# Patient Record
Sex: FEMALE | Race: WHITE | Employment: FULL TIME | URBAN - METROPOLITAN AREA
[De-identification: names, ages, dates, MRNs, and addresses within clinical notes are randomized per-mention and may not be internally consistent; named-entity substitution may affect disease eponyms.]

---

## 2017-11-13 PROBLEM — Z98.890 S/P LEEP OF CERVIX: Status: ACTIVE | Noted: 2017-11-13

## 2017-11-13 PROCEDURE — 88175 CYTOPATH C/V AUTO FLUID REDO: CPT | Performed by: OBSTETRICS & GYNECOLOGY

## 2017-11-13 PROCEDURE — 87624 HPV HI-RISK TYP POOLED RSLT: CPT | Performed by: OBSTETRICS & GYNECOLOGY

## 2021-09-07 PROBLEM — Z91.89 AT HIGH RISK FOR BREAST CANCER: Status: ACTIVE | Noted: 2021-09-07

## 2023-03-10 ENCOUNTER — APPOINTMENT (OUTPATIENT)
Dept: GENERAL RADIOLOGY | Age: 48
End: 2023-03-10
Attending: NURSE PRACTITIONER
Payer: COMMERCIAL

## 2023-03-10 ENCOUNTER — HOSPITAL ENCOUNTER (OUTPATIENT)
Age: 48
Discharge: HOME | End: 2023-03-10
Payer: COMMERCIAL

## 2023-03-10 VITALS
DIASTOLIC BLOOD PRESSURE: 70 MMHG | HEART RATE: 87 BPM | SYSTOLIC BLOOD PRESSURE: 133 MMHG | OXYGEN SATURATION: 100 % | TEMPERATURE: 98 F | RESPIRATION RATE: 16 BRPM

## 2023-03-10 DIAGNOSIS — Z20.822 ENCOUNTER FOR LABORATORY TESTING FOR COVID-19 VIRUS: ICD-10-CM

## 2023-03-10 DIAGNOSIS — J01.01 ACUTE RECURRENT MAXILLARY SINUSITIS: Primary | ICD-10-CM

## 2023-03-10 DIAGNOSIS — R05.1 ACUTE COUGH: ICD-10-CM

## 2023-03-10 DIAGNOSIS — H10.022 OTHER MUCOPURULENT CONJUNCTIVITIS OF LEFT EYE: ICD-10-CM

## 2023-03-10 DIAGNOSIS — M94.0 COSTOCHONDRITIS: ICD-10-CM

## 2023-03-10 LAB
POCT INFLUENZA A: NEGATIVE
POCT INFLUENZA B: NEGATIVE
S PYO AG THROAT QL: NEGATIVE
SARS-COV-2 RNA RESP QL NAA+PROBE: NOT DETECTED

## 2023-03-10 PROCEDURE — 99203 OFFICE O/P NEW LOW 30 MIN: CPT | Performed by: NURSE PRACTITIONER

## 2023-03-10 PROCEDURE — 71101 X-RAY EXAM UNILAT RIBS/CHEST: CPT | Performed by: NURSE PRACTITIONER

## 2023-03-10 PROCEDURE — U0002 COVID-19 LAB TEST NON-CDC: HCPCS | Performed by: NURSE PRACTITIONER

## 2023-03-10 PROCEDURE — 87880 STREP A ASSAY W/OPTIC: CPT | Performed by: NURSE PRACTITIONER

## 2023-03-10 PROCEDURE — 87502 INFLUENZA DNA AMP PROBE: CPT | Performed by: NURSE PRACTITIONER

## 2023-03-10 RX ORDER — CIPROFLOXACIN HYDROCHLORIDE 3.5 MG/ML
2 SOLUTION/ DROPS TOPICAL
Qty: 1 EACH | Refills: 0 | Status: SHIPPED | OUTPATIENT
Start: 2023-03-10 | End: 2023-03-17

## 2023-03-10 RX ORDER — AMOXICILLIN AND CLAVULANATE POTASSIUM 875; 125 MG/1; MG/1
1 TABLET, FILM COATED ORAL 2 TIMES DAILY
Qty: 14 TABLET | Refills: 0 | Status: SHIPPED | OUTPATIENT
Start: 2023-03-10 | End: 2023-03-17

## 2023-03-10 NOTE — DISCHARGE INSTRUCTIONS
Augmentin 1 tablet twice per day for 7 days, take with food  Ciprofloxacin eye drops 2 drops to left eye every 4 hours while awake. Do not wear contacts until infection fully resolves.    Ibuprofen 600 mg every 6 hours as needed for pain  Heating pad to left rib area, 10-15 minutes at at time a few times per day  Flonase nasal spray, 2 sprays in each nostril daily for 2 weeks  Armond Rosin Sinus rinse, available over the counter, do this 2-3 times per day  Push fluids  Steam showers  If you develop facial swelling, chest pain, shortness of breath or any new/worsening symptoms, return or go to the emergency room  If no improvement in 1 week, please see your primary care provider

## 2023-03-10 NOTE — ED INITIAL ASSESSMENT (HPI)
Pt presents of cough and congestion x 5 days. Pt now reports left facial pain into left ear and down left side of neck. \"Swollen lymph nodes on left side\". Pt reports left rib area sore. No fevers. Chills and body aches.

## 2024-11-20 ENCOUNTER — HOSPITAL ENCOUNTER (OUTPATIENT)
Age: 49
Discharge: HOME | End: 2024-11-20
Payer: COMMERCIAL

## 2024-11-20 VITALS
RESPIRATION RATE: 16 BRPM | OXYGEN SATURATION: 100 % | HEART RATE: 83 BPM | SYSTOLIC BLOOD PRESSURE: 145 MMHG | TEMPERATURE: 98 F | DIASTOLIC BLOOD PRESSURE: 65 MMHG

## 2024-11-20 DIAGNOSIS — L50.9 URTICARIA: Primary | ICD-10-CM

## 2024-11-20 PROCEDURE — 99213 OFFICE O/P EST LOW 20 MIN: CPT | Performed by: NURSE PRACTITIONER

## 2024-11-20 RX ORDER — TRIAMCINOLONE ACETONIDE 1 MG/G
CREAM TOPICAL 2 TIMES DAILY
Qty: 15 G | Refills: 0 | Status: SHIPPED | OUTPATIENT
Start: 2024-11-20

## 2024-11-20 RX ORDER — PREDNISONE 20 MG/1
40 TABLET ORAL DAILY
Qty: 10 TABLET | Refills: 0 | Status: SHIPPED | OUTPATIENT
Start: 2024-11-20 | End: 2024-11-25

## 2024-11-20 RX ORDER — VENLAFAXINE HYDROCHLORIDE 75 MG/1
75 CAPSULE, EXTENDED RELEASE ORAL DAILY
COMMUNITY
Start: 2024-09-23

## 2024-11-20 NOTE — DISCHARGE INSTRUCTIONS
I have sent in 2 medications to help provide relief for your rash.  Please follow-up with dermatology as scheduled tomorrow.  You can use a cool compress for additional pain relief.  Please return to immediate care with any new or worsening symptoms.

## 2024-11-20 NOTE — ED INITIAL ASSESSMENT (HPI)
Pt c/o congestion, sore throat, cough, swollen neck glands x3-4 days, B axilla rash, painful, itching and burning, warm to touch since yesterday.  No drainage.  No fever.

## 2024-11-21 NOTE — ED PROVIDER NOTES
Patient Seen in: [facility]      History     Chief Complaint   Patient presents with    Skin Problem     Stated Complaint: Skin Problem    Subjective:   49-year-old female presented with complaints of rash to bilateral axilla since last night/early this morning.  Patient states it started off as a burning/itching and has now developed into a worsening burning pain where she is unable to put her arms down.  Patient denies any itching.  Denies any new deodorants, lotions, medications.  No recent shaving.    The history is provided by the patient.             Objective:     Past Medical History:    ANXIETY    CANCER    basal cell carcinoma    CERVICAL DYSPLASIA    Genitourinary disease    3 MONTH EVAL ELDA MARIO/BAO     Genitourinary disease    FU HX UTERINE PROLAPSE/STRESS INC/FREQUENCY SP BAO/MARIO BNS     Genitourinary disease    FU RECURRENT STRESS INCONTINENCE/CYSTOSCOPY/URODY     GI DISORDER    spastic bowel    HYPERLIPIDEMIA    OTHER DISEASES    CONSULT DR SCHROEDER HX STRESS INC.    OTHER DISEASES    CONSULT DR SCHROEDER SURGERY2/21/14              Past Surgical History:   Procedure Laterality Date    Breast biopsy Left 2006    EXC bx- dez    Cholecystectomy  1994    Colonoscopy  2010    benign polyps    Colonoscopy  2012    wnl per patient     Hysterectomy  2/12/14    KETT/LAS    Other surgical history  09 2007    left breast  lumpectomy benign    Other surgical history  01 2010    arthroscopic surgery  right hip    Other surgical history  2/12/14    MARIO BLADDER NECK SUSP/KRENGEL    Skin surgery  5/2011    basal cell removed from under left eye                Social History     Socioeconomic History    Marital status:    Tobacco Use    Smoking status: Never    Smokeless tobacco: Never   Vaping Use    Vaping status: Never Used   Substance and Sexual Activity    Alcohol use: Yes     Alcohol/week: 0.0 standard drinks of alcohol     Comment: social    Drug use: No    Sexual activity: Yes      Partners: Male     Birth control/protection: Vasectomy, Hysterectomy     Comment: 4/4/19 current      Social Drivers of Health      Received from [facility]              Review of Systems    Positive for stated complaint: Skin Problem  Other systems are as noted in HPI.  Constitutional and vital signs reviewed.      All other systems reviewed and negative except as noted above.    Physical Exam     ED Triage Vitals [11/20/24 1709]   /65   Pulse 83   Resp 16   Temp 97.8 °F (36.6 °C)   Temp src Oral   SpO2 100 %   O2 Device None (Room air)       Current Vitals:   Vital Signs  BP: 145/65  Pulse: 83  Resp: 16  Temp: 97.8 °F (36.6 °C)  Temp src: Oral    Oxygen Therapy  SpO2: 100 %  O2 Device: None (Room air)        Physical Exam  Vitals and nursing note reviewed.   Constitutional:       Appearance: Normal appearance.   Cardiovascular:      Rate and Rhythm: Normal rate and regular rhythm.      Pulses: Normal pulses.      Heart sounds: Normal heart sounds.   Pulmonary:      Effort: Pulmonary effort is normal.      Breath sounds: Normal breath sounds.   Musculoskeletal:         General: Normal range of motion.   Skin:     Findings: Erythema and rash present.      Comments: +erythematous rash noted to bilateral axilla not consistent with cellulitis or zoster. +tender to touch. +raised in some areas. No scaly.    Neurological:      Mental Status: She is alert and oriented to person, place, and time.   Psychiatric:         Mood and Affect: Mood normal.         Behavior: Behavior normal.             ED Course   Labs Reviewed - No data to display                MDM             Medical Decision Making  Patient presented with rash to bilateral axilla with unknown etiology.  Appears like a urticarial rash?  Will treat with short course of steroids and triamcinolone cream.  Patient has appointment with dermatology tomorrow.   strict return precautions discussed with patient.    Ddx: Urticarial rash versus  cellulitis versus folliculitis        Disposition and Plan     Clinical Impression:  1. Urticaria         Disposition:  Discharge  11/20/2024  5:33 pm    Follow-up:  Sujey Juárez, PA-C  [address] [address] [phone]                Medications Prescribed:  Discharge Medication List as of 11/20/2024  5:36 PM        START taking these medications    Details   predniSONE 20 MG Oral Tab Take 2 tablets (40 mg total) by mouth daily for 5 days., Normal, Disp-10 tablet, R-0      triamcinolone 0.1 % External Cream Apply topically 2 (two) times daily., Normal, Disp-15 g, R-0                 Supplementary Documentation: